# Patient Record
Sex: MALE | Race: WHITE | Employment: OTHER | URBAN - NONMETROPOLITAN AREA
[De-identification: names, ages, dates, MRNs, and addresses within clinical notes are randomized per-mention and may not be internally consistent; named-entity substitution may affect disease eponyms.]

---

## 2017-12-01 ENCOUNTER — OFFICE VISIT (OUTPATIENT)
Dept: FAMILY MEDICINE CLINIC | Age: 27
End: 2017-12-01
Payer: COMMERCIAL

## 2017-12-01 VITALS
TEMPERATURE: 98.2 F | BODY MASS INDEX: 33.43 KG/M2 | HEART RATE: 73 BPM | DIASTOLIC BLOOD PRESSURE: 92 MMHG | WEIGHT: 213 LBS | HEIGHT: 67 IN | OXYGEN SATURATION: 95 % | RESPIRATION RATE: 18 BRPM | SYSTOLIC BLOOD PRESSURE: 132 MMHG

## 2017-12-01 DIAGNOSIS — Z00.00 ANNUAL PHYSICAL EXAM: Primary | ICD-10-CM

## 2017-12-01 DIAGNOSIS — H91.91 HEARING LOSS OF RIGHT EAR, UNSPECIFIED HEARING LOSS TYPE: ICD-10-CM

## 2017-12-01 PROCEDURE — 99395 PREV VISIT EST AGE 18-39: CPT | Performed by: FAMILY MEDICINE

## 2017-12-01 RX ORDER — SUMATRIPTAN 100 MG/1
100 TABLET, FILM COATED ORAL
Qty: 9 TABLET | Refills: 11 | Status: SHIPPED | OUTPATIENT
Start: 2017-12-01 | End: 2018-04-16 | Stop reason: SDUPTHER

## 2017-12-01 ASSESSMENT — PATIENT HEALTH QUESTIONNAIRE - PHQ9
1. LITTLE INTEREST OR PLEASURE IN DOING THINGS: 0
SUM OF ALL RESPONSES TO PHQ QUESTIONS 1-9: 0
2. FEELING DOWN, DEPRESSED OR HOPELESS: 0
SUM OF ALL RESPONSES TO PHQ9 QUESTIONS 1 & 2: 0

## 2017-12-01 NOTE — PROGRESS NOTES
11     No current facility-administered medications for this visit. No Known Allergies    Health Maintenance   Topic Date Due    HIV screen  10/15/2007    DTaP/Tdap/Td vaccine (1 - Tdap) 10/15/2011    Pneumococcal med risk (1 of 1 - PPSV23) 10/15/2011    Flu vaccine (1) 09/01/2017       Subjective:      Review of Systems  Review of Systems   Constitutional: Negative for chills and fever. HENT: Negative for congestion. Respiratory: Negative for cough, chest tightness and shortness of breath. Cardiovascular: Negative for chest pain, palpitations and leg swelling. Gastrointestinal: Negative for abdominal pain, anal bleeding, constipation, diarrhea and nausea. Genitourinary: Negative for difficulty urinating. Psychiatric/Behavioral: Negative. See HPI for visit specific review of symptoms. All others negative      Objective:   BP (!) 132/92   Pulse 73   Temp 98.2 °F (36.8 °C)   Resp 18   Ht 5' 7\" (1.702 m)   Wt 213 lb (96.6 kg)   SpO2 95%   BMI 33.36 kg/m²   Physical Exam  Physical Exam   Constitutional: appears well-developed. does not appear ill. Eyes: Pupils are equal, round, and reactive to light. Neck: Normal range of motion. Neck supple. Cardiovascular: Normal rate and regular rhythm. Exam reveals no friction rub. No murmur heard. Pulmonary/Chest: Effort normal and breath sounds normal. No respiratory distress. He has no wheezes. no rales. Abdominal: Soft. Bowel sounds are normal. He exhibits no distension. There is no tenderness. There is no rebound and no guarding. Musculoskeletal: exhibits no edema. Neurological: alert. Psychiatric: normal mood and affect. behavior is normal.       No results found for this or any previous visit (from the past 672 hour(s)). Assessment & Plan: The following diagnoses and conditions are stable with no further orders unless indicated:  1. Annual physical exam      2.  Hearing loss of right ear, unspecified

## 2017-12-01 NOTE — PATIENT INSTRUCTIONS
The following may help for colds and allergies:    Antihistamines: Help nasal drainage, watery eyes, sneezing, sore throat.  Benadryl (diphenhydramine) 25mg every 6 hours as needed    Zyrtec (cetirizine) 10mg once a day   Allegra (fexofenadine) 180mg once a day   Claritin or Alavert (loratadine) 10mg once a day     Decongestants: Help ear pressure, sinus pressure, and nasal congestion.     Sudafed (pseudoephedrine) 6, 12, and 24 hour formulations   Phenylephrine     Expectorants: Loosen mucous and help nasal and chest congestion   Mucinex 600mg twice a day     Nasal Steroid: Help nasal drainage, nasal congestion, sneezing, and sinus pressure   Nasacort 1 spray each nostril once a day   Flonase 1 spray each nostril once a day   Rhinocort 1 spray each nostril once a day

## 2017-12-01 NOTE — LETTER
[address] [facility]  [address] [facility]  Phone: [phone]  Fax: [phone]    Teresa Jurado MD        December 1, 2017    [facility]  [address] [place] [address]      Dear Mauri Mckay: You are scheduled to see Dr. Claude Zhang on December 26th at 9:00am  If you need to reschedule, please call [phone]    If you have any questions or concerns, please don't hesitate to call.     Sincerely,        Teresa Jurado MD

## 2018-04-16 ENCOUNTER — OFFICE VISIT (OUTPATIENT)
Dept: FAMILY MEDICINE CLINIC | Age: 28
End: 2018-04-16
Payer: COMMERCIAL

## 2018-04-16 VITALS
WEIGHT: 214 LBS | OXYGEN SATURATION: 98 % | SYSTOLIC BLOOD PRESSURE: 124 MMHG | HEART RATE: 80 BPM | TEMPERATURE: 98.7 F | DIASTOLIC BLOOD PRESSURE: 86 MMHG | BODY MASS INDEX: 33.52 KG/M2 | RESPIRATION RATE: 18 BRPM

## 2018-04-16 DIAGNOSIS — R53.83 OTHER FATIGUE: ICD-10-CM

## 2018-04-16 DIAGNOSIS — R53.83 OTHER FATIGUE: Primary | ICD-10-CM

## 2018-04-16 LAB
ALBUMIN SERPL-MCNC: 4.6 G/DL (ref 3.5–5.2)
ALP BLD-CCNC: 88 U/L (ref 40–130)
ALT SERPL-CCNC: 50 U/L (ref 5–41)
ANION GAP SERPL CALCULATED.3IONS-SCNC: 17 MMOL/L (ref 7–19)
AST SERPL-CCNC: 23 U/L (ref 5–40)
BACTERIA: NEGATIVE /HPF
BASOPHILS ABSOLUTE: 0.1 K/UL (ref 0–0.2)
BASOPHILS RELATIVE PERCENT: 0.9 % (ref 0–1)
BILIRUB SERPL-MCNC: <0.2 MG/DL (ref 0.2–1.2)
BILIRUBIN URINE: NEGATIVE
BLOOD, URINE: NEGATIVE
BUN BLDV-MCNC: 16 MG/DL (ref 6–20)
CALCIUM SERPL-MCNC: 9.7 MG/DL (ref 8.6–10)
CHLORIDE BLD-SCNC: 102 MMOL/L (ref 98–111)
CLARITY: CLEAR
CO2: 23 MMOL/L (ref 22–29)
COLOR: YELLOW
CREAT SERPL-MCNC: 0.9 MG/DL (ref 0.5–1.2)
EOSINOPHILS ABSOLUTE: 0.1 K/UL (ref 0–0.6)
EOSINOPHILS RELATIVE PERCENT: 2 % (ref 0–5)
EPITHELIAL CELLS, UA: 0 /HPF (ref 0–5)
GFR NON-AFRICAN AMERICAN: >60
GLUCOSE BLD-MCNC: 88 MG/DL (ref 74–109)
GLUCOSE URINE: NEGATIVE MG/DL
HCT VFR BLD CALC: 49.8 % (ref 42–52)
HEMOGLOBIN: 17.4 G/DL (ref 14–18)
HYALINE CASTS: 0 /HPF (ref 0–8)
KETONES, URINE: NEGATIVE MG/DL
LEUKOCYTE ESTERASE, URINE: NEGATIVE
LYMPHOCYTES ABSOLUTE: 1.9 K/UL (ref 1.1–4.5)
LYMPHOCYTES RELATIVE PERCENT: 28.5 % (ref 20–40)
MCH RBC QN AUTO: 29.8 PG (ref 27–31)
MCHC RBC AUTO-ENTMCNC: 34.9 G/DL (ref 33–37)
MCV RBC AUTO: 85.4 FL (ref 80–94)
MONOCYTES ABSOLUTE: 0.6 K/UL (ref 0–0.9)
MONOCYTES RELATIVE PERCENT: 8.5 % (ref 0–10)
NEUTROPHILS ABSOLUTE: 3.9 K/UL (ref 1.5–7.5)
NEUTROPHILS RELATIVE PERCENT: 59.8 % (ref 50–65)
NITRITE, URINE: NEGATIVE
PDW BLD-RTO: 12.5 % (ref 11.5–14.5)
PH UA: 7
PLATELET # BLD: 301 K/UL (ref 130–400)
PMV BLD AUTO: 9.6 FL (ref 9.4–12.4)
POTASSIUM SERPL-SCNC: 4.3 MMOL/L (ref 3.5–5)
PROTEIN UA: NEGATIVE MG/DL
RBC # BLD: 5.83 M/UL (ref 4.7–6.1)
RBC UA: 0 /HPF (ref 0–4)
SODIUM BLD-SCNC: 142 MMOL/L (ref 136–145)
SPECIFIC GRAVITY UA: 1.02
T4 FREE: 1.3 NG/DL (ref 0.9–1.7)
TESTOSTERONE TOTAL: 420.1 NG/DL (ref 249–836)
TOTAL PROTEIN: 7 G/DL (ref 6.6–8.7)
TSH SERPL DL<=0.05 MIU/L-ACNC: 2.22 UIU/ML (ref 0.27–4.2)
UROBILINOGEN, URINE: 0.2 E.U./DL
WBC # BLD: 6.5 K/UL (ref 4.8–10.8)
WBC UA: 0 /HPF (ref 0–5)

## 2018-04-16 PROCEDURE — 99213 OFFICE O/P EST LOW 20 MIN: CPT | Performed by: FAMILY MEDICINE

## 2018-04-16 RX ORDER — SUMATRIPTAN 100 MG/1
100 TABLET, FILM COATED ORAL
Qty: 9 TABLET | Refills: 11 | Status: SHIPPED | OUTPATIENT
Start: 2018-04-16 | End: 2018-11-29 | Stop reason: SDUPTHER

## 2018-04-16 ASSESSMENT — ENCOUNTER SYMPTOMS
ANAL BLEEDING: 0
CONSTIPATION: 0
COUGH: 0
ABDOMINAL PAIN: 0
NAUSEA: 0
SHORTNESS OF BREATH: 0
DIARRHEA: 0
CHEST TIGHTNESS: 0

## 2018-05-12 ENCOUNTER — HOSPITAL ENCOUNTER (EMERGENCY)
Facility: HOSPITAL | Age: 28
Discharge: HOME | End: 2018-05-13
Attending: OTOLARYNGOLOGY | Admitting: OTOLARYNGOLOGY

## 2018-05-12 ENCOUNTER — HOSPITAL ENCOUNTER (EMERGENCY)
Age: 28
Discharge: LEFT BEFORE DISCHARGE | End: 2018-05-12
Payer: COMMERCIAL

## 2018-05-12 VITALS
DIASTOLIC BLOOD PRESSURE: 92 MMHG | WEIGHT: 200 LBS | TEMPERATURE: 98.8 F | HEART RATE: 90 BPM | OXYGEN SATURATION: 95 % | RESPIRATION RATE: 16 BRPM | HEIGHT: 68 IN | SYSTOLIC BLOOD PRESSURE: 145 MMHG | BODY MASS INDEX: 30.31 KG/M2

## 2018-05-12 PROBLEM — S01.81XA FACIAL LACERATION: Status: ACTIVE | Noted: 2018-05-12
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
## 2018-05-12 PROCEDURE — 99283 EMERGENCY DEPT VISIT LOW MDM: CPT | Performed by: OTOLARYNGOLOGY

## 2018-05-12 PROCEDURE — 13132 CMPLX RPR F/C/C/M/N/AX/G/H/F: CPT | Performed by: OTOLARYNGOLOGY

## 2018-05-12 PROCEDURE — 99283 EMERGENCY DEPT VISIT LOW MDM: CPT

## 2018-05-12 PROCEDURE — 4500000002 HC ER NO CHARGE

## 2018-05-12 RX ORDER — LIDOCAINE HYDROCHLORIDE AND EPINEPHRINE 10; 10 MG/ML; UG/ML
5 INJECTION, SOLUTION INFILTRATION; PERINEURAL ONCE
Status: COMPLETED | OUTPATIENT
Start: 2018-05-12 | End: 2018-05-13

## 2018-05-12 ASSESSMENT — PAIN SCALES - GENERAL: PAINLEVEL_OUTOF10: 1

## 2018-05-12 ASSESSMENT — PAIN DESCRIPTION - PAIN TYPE: TYPE: ACUTE PAIN

## 2018-05-12 ASSESSMENT — PAIN DESCRIPTION - LOCATION: LOCATION: FACE

## 2018-05-13 VITALS
OXYGEN SATURATION: 95 % | HEART RATE: 85 BPM | HEIGHT: 68 IN | WEIGHT: 200 LBS | BODY MASS INDEX: 30.31 KG/M2 | RESPIRATION RATE: 18 BRPM | DIASTOLIC BLOOD PRESSURE: 91 MMHG | SYSTOLIC BLOOD PRESSURE: 149 MMHG | TEMPERATURE: 98 F

## 2018-05-13 PROBLEM — S16.2XXA: Status: ACTIVE | Noted: 2018-05-13

## 2018-05-13 RX ORDER — CEPHALEXIN 500 MG/1
500 CAPSULE ORAL 4 TIMES DAILY
Qty: 28 CAPSULE | Refills: 0 | Status: SHIPPED | OUTPATIENT
Start: 2018-05-13 | End: 2021-03-12

## 2018-05-13 RX ORDER — HYDROCODONE BITARTRATE AND ACETAMINOPHEN 5; 325 MG/1; MG/1
1 TABLET ORAL ONCE AS NEEDED
Status: CANCELLED | OUTPATIENT
Start: 2018-05-13 | End: 2018-05-23

## 2018-05-13 RX ORDER — HYDROCODONE BITARTRATE AND ACETAMINOPHEN 5; 325 MG/1; MG/1
1 TABLET ORAL EVERY 4 HOURS PRN
Qty: 20 TABLET | Refills: 0 | Status: SHIPPED | OUTPATIENT
Start: 2018-05-13 | End: 2018-05-21

## 2018-05-13 RX ADMIN — LIDOCAINE HYDROCHLORIDE,EPINEPHRINE BITARTRATE 5 ML: 10; .01 INJECTION, SOLUTION INFILTRATION; PERINEURAL at 00:07

## 2018-05-21 ENCOUNTER — OFFICE VISIT (OUTPATIENT)
Dept: OTOLARYNGOLOGY | Facility: CLINIC | Age: 28
End: 2018-05-21

## 2018-05-21 VITALS
SYSTOLIC BLOOD PRESSURE: 139 MMHG | TEMPERATURE: 97 F | WEIGHT: 204 LBS | DIASTOLIC BLOOD PRESSURE: 99 MMHG | HEART RATE: 80 BPM | HEIGHT: 68 IN | BODY MASS INDEX: 30.92 KG/M2 | RESPIRATION RATE: 20 BRPM

## 2018-05-21 DIAGNOSIS — S01.91XD LACERATION OF HEAD AND NECK, SUBSEQUENT ENCOUNTER: Primary | ICD-10-CM

## 2018-05-21 DIAGNOSIS — S11.91XD LACERATION OF HEAD AND NECK, SUBSEQUENT ENCOUNTER: Primary | ICD-10-CM

## 2018-05-21 PROCEDURE — 99024 POSTOP FOLLOW-UP VISIT: CPT | Performed by: OTOLARYNGOLOGY

## 2018-05-21 RX ORDER — SUMATRIPTAN 100 MG/1
100 TABLET, FILM COATED ORAL
COMMUNITY
Start: 2018-04-16

## 2018-06-11 ENCOUNTER — TELEPHONE (OUTPATIENT)
Dept: FAMILY MEDICINE CLINIC | Age: 28
End: 2018-06-11

## 2018-09-19 ENCOUNTER — TRANSCRIBE ORDERS (OUTPATIENT)
Dept: ADMINISTRATIVE | Facility: HOSPITAL | Age: 28
End: 2018-09-19

## 2018-09-19 ENCOUNTER — APPOINTMENT (OUTPATIENT)
Dept: LAB | Facility: HOSPITAL | Age: 28
End: 2018-09-19
Attending: OBSTETRICS & GYNECOLOGY

## 2018-09-19 DIAGNOSIS — N46.9 INFERTILITY MALE: Primary | ICD-10-CM

## 2018-09-19 LAB
CHARACTER SMN: NORMAL
COLLECTION METHOD SMN: ABNORMAL
EPI CELLS #/AREA SMN HPF: ABNORMAL /HPF
LIQUEFACTION TIME SMN: NORMAL MIN
NON PROGRESSIVE MOTILITY: ABNORMAL %
RBC #/AREA SMN HPF: ABNORMAL /HPF
SEX ABSTIN DURATION TIME PATIENT: 3 DAYS
SLOW PROGRESSIVE MOTILITY: ABNORMAL %
SMI: 0 (ref 80–400)
SPEC CONTAINER SPEC: ABNORMAL
SPECIMEN VOL SMN: 3.5 ML
SPERM # SMN: <2 MILLIONS/ML
SPERM IMMOTILE NFR SMN: ABNORMAL %
SPERM MOTILE NFR SMN: 0 % MOTILE (ref 50–100)
SPERM SMN: ABNORMAL % NORMAL (ref 15–100)
VISC SMN: NORMAL CP
WBC SMN QL: ABNORMAL /HPF
WHO STANDARD: ABNORMAL

## 2018-09-19 PROCEDURE — 89320 SEMEN ANAL VOL/COUNT/MOT: CPT | Performed by: OBSTETRICS & GYNECOLOGY

## 2018-10-04 ENCOUNTER — TELEPHONE (OUTPATIENT)
Dept: OTOLARYNGOLOGY | Facility: CLINIC | Age: 28
End: 2018-10-04

## 2018-10-04 RX ORDER — SULFAMETHOXAZOLE AND TRIMETHOPRIM 800; 160 MG/1; MG/1
1 TABLET ORAL 2 TIMES DAILY
Qty: 20 TABLET | Refills: 0 | Status: SHIPPED | OUTPATIENT
Start: 2018-10-04 | End: 2018-10-14

## 2018-11-29 ENCOUNTER — OFFICE VISIT (OUTPATIENT)
Dept: FAMILY MEDICINE CLINIC | Age: 28
End: 2018-11-29
Payer: COMMERCIAL

## 2018-11-29 VITALS
WEIGHT: 211 LBS | BODY MASS INDEX: 32.08 KG/M2 | DIASTOLIC BLOOD PRESSURE: 86 MMHG | RESPIRATION RATE: 18 BRPM | OXYGEN SATURATION: 97 % | SYSTOLIC BLOOD PRESSURE: 134 MMHG | TEMPERATURE: 98 F | HEART RATE: 86 BPM

## 2018-11-29 DIAGNOSIS — L73.9 FOLLICULITIS: Primary | ICD-10-CM

## 2018-11-29 PROCEDURE — 99213 OFFICE O/P EST LOW 20 MIN: CPT | Performed by: FAMILY MEDICINE

## 2018-11-29 RX ORDER — DOXYCYCLINE 100 MG/1
100 CAPSULE ORAL 2 TIMES DAILY
Qty: 20 CAPSULE | Refills: 0 | Status: SHIPPED | OUTPATIENT
Start: 2018-11-29

## 2018-11-29 RX ORDER — SUMATRIPTAN 100 MG/1
100 TABLET, FILM COATED ORAL
Qty: 9 TABLET | Refills: 11 | Status: SHIPPED | OUTPATIENT
Start: 2018-11-29 | End: 2018-11-29

## 2018-11-29 NOTE — LETTER
[address] [facility]  [address]  Phone: [phone]  Fax: [phone]    ANIBAL Burrell MD            Patient: Shawnee Anderson   YOB: 1990   Date of Visit: 11/29/2018        To Whom It May Concern:    Please excuse Sharla Dumont from work on 11/28/2018 due to illness. If you have any questions or concerns, please don't hesitate to call.     Sincerely,        Nieves Perez MD

## 2018-11-29 NOTE — PROGRESS NOTES
SUMAtriptan (IMITREX) 100 MG tablet; Take 1 tablet by mouth once as needed for Migraine  Dispense: 9 tablet; Refill: 11    2. Folliculitis    - doxycycline monohydrate (MONODOX) 100 MG capsule; Take 1 capsule by mouth 2 times daily  Dispense: 20 capsule; Refill: 0            Return in about 6 months (around 5/29/2019) for Routine follow up - 15 minute visit. Discussed use, benefit, and side effects of prescribed medications. All patientquestions answered. Pt voiced understanding. Reviewed health maintenance. Instructedto continue current medications, diet and exercise. Patient agreed with treatmentplan. Follow up as directed.

## 2019-01-30 ENCOUNTER — TELEPHONE (OUTPATIENT)
Dept: FAMILY MEDICINE CLINIC | Age: 29
End: 2019-01-30

## 2019-05-31 ENCOUNTER — TELEPHONE (OUTPATIENT)
Dept: FAMILY MEDICINE CLINIC | Age: 29
End: 2019-05-31

## 2019-05-31 NOTE — TELEPHONE ENCOUNTER
Carolynne Media called to cancel his appt with Dr. Tiny Dancer for 6/3/19 and advised that he has moved to [place]. Thank you.

## 2020-08-14 ENCOUNTER — OFFICE VISIT (OUTPATIENT)
Dept: INTERNAL MEDICINE | Facility: CLINIC | Age: 30
End: 2020-08-14

## 2020-08-14 VITALS
RESPIRATION RATE: 20 BRPM | SYSTOLIC BLOOD PRESSURE: 153 MMHG | HEART RATE: 73 BPM | DIASTOLIC BLOOD PRESSURE: 87 MMHG | BODY MASS INDEX: 34.4 KG/M2 | TEMPERATURE: 98.7 F | WEIGHT: 219.2 LBS | HEIGHT: 67 IN

## 2020-08-14 DIAGNOSIS — F41.9 ANXIETY: Primary | ICD-10-CM

## 2020-08-14 DIAGNOSIS — F32.89 OTHER DEPRESSION: ICD-10-CM

## 2020-08-14 DIAGNOSIS — R07.9 CHEST PAIN, UNSPECIFIED TYPE: ICD-10-CM

## 2020-08-14 DIAGNOSIS — R03.0 ELEVATED BLOOD PRESSURE READING: ICD-10-CM

## 2020-08-14 PROCEDURE — 93000 ELECTROCARDIOGRAM COMPLETE: CPT | Performed by: NURSE PRACTITIONER

## 2020-08-14 PROCEDURE — 99214 OFFICE O/P EST MOD 30 MIN: CPT | Performed by: NURSE PRACTITIONER

## 2020-08-14 RX ORDER — FLUOXETINE 10 MG/1
10 CAPSULE ORAL DAILY
Qty: 45 CAPSULE | Refills: 0 | Status: SHIPPED | OUTPATIENT
Start: 2020-08-14 | End: 2020-08-29 | Stop reason: SDUPTHER

## 2020-08-14 RX ORDER — FLUOXETINE 10 MG/1
10 CAPSULE ORAL DAILY
Qty: 45 CAPSULE | Refills: 0 | Status: SHIPPED | OUTPATIENT
Start: 2020-08-14 | End: 2020-08-14

## 2020-08-14 RX ORDER — BUSPIRONE HYDROCHLORIDE 5 MG/1
5 TABLET ORAL 3 TIMES DAILY
Qty: 90 TABLET | Refills: 0 | Status: SHIPPED | OUTPATIENT
Start: 2020-08-14 | End: 2020-08-29 | Stop reason: SDUPTHER

## 2020-08-14 RX ORDER — BUSPIRONE HYDROCHLORIDE 5 MG/1
5 TABLET ORAL 3 TIMES DAILY
Qty: 90 TABLET | Refills: 0 | Status: SHIPPED | OUTPATIENT
Start: 2020-08-14 | End: 2020-08-14

## 2020-08-28 DIAGNOSIS — F41.9 ANXIETY: ICD-10-CM

## 2020-08-28 DIAGNOSIS — F32.89 OTHER DEPRESSION: ICD-10-CM

## 2020-08-29 RX ORDER — FLUOXETINE 10 MG/1
10 CAPSULE ORAL DAILY
Qty: 90 CAPSULE | Refills: 0 | Status: SHIPPED | OUTPATIENT
Start: 2020-08-29 | End: 2020-09-16 | Stop reason: SDUPTHER

## 2020-08-29 RX ORDER — BUSPIRONE HYDROCHLORIDE 5 MG/1
5 TABLET ORAL 3 TIMES DAILY
Qty: 270 TABLET | Refills: 0 | Status: SHIPPED | OUTPATIENT
Start: 2020-08-29 | End: 2020-09-16 | Stop reason: SDUPTHER

## 2020-09-16 ENCOUNTER — OFFICE VISIT (OUTPATIENT)
Dept: INTERNAL MEDICINE | Facility: CLINIC | Age: 30
End: 2020-09-16

## 2020-09-16 VITALS
SYSTOLIC BLOOD PRESSURE: 132 MMHG | BODY MASS INDEX: 34.59 KG/M2 | RESPIRATION RATE: 18 BRPM | TEMPERATURE: 97.9 F | HEIGHT: 67 IN | DIASTOLIC BLOOD PRESSURE: 84 MMHG | WEIGHT: 220.38 LBS | OXYGEN SATURATION: 97 % | HEART RATE: 70 BPM

## 2020-09-16 DIAGNOSIS — F41.9 ANXIETY: Primary | ICD-10-CM

## 2020-09-16 DIAGNOSIS — F32.89 OTHER DEPRESSION: ICD-10-CM

## 2020-09-16 PROCEDURE — 99213 OFFICE O/P EST LOW 20 MIN: CPT | Performed by: NURSE PRACTITIONER

## 2020-09-16 RX ORDER — FLUOXETINE HYDROCHLORIDE 20 MG/1
20 CAPSULE ORAL DAILY
Qty: 90 CAPSULE | Refills: 1 | Status: SHIPPED | OUTPATIENT
Start: 2020-09-16 | End: 2021-04-07 | Stop reason: SDUPTHER

## 2020-09-16 RX ORDER — BUSPIRONE HYDROCHLORIDE 10 MG/1
10 TABLET ORAL 3 TIMES DAILY
Qty: 270 TABLET | Refills: 1 | Status: SHIPPED | OUTPATIENT
Start: 2020-09-16 | End: 2021-04-07 | Stop reason: SDUPTHER

## 2021-02-23 ENCOUNTER — LAB (OUTPATIENT)
Dept: LAB | Facility: HOSPITAL | Age: 31
End: 2021-02-23

## 2021-02-23 ENCOUNTER — TRANSCRIBE ORDERS (OUTPATIENT)
Dept: LAB | Facility: HOSPITAL | Age: 31
End: 2021-02-23

## 2021-02-23 DIAGNOSIS — Z31.41 FERTILITY TESTING: ICD-10-CM

## 2021-02-23 DIAGNOSIS — Z31.41 FERTILITY TESTING: Primary | ICD-10-CM

## 2021-02-23 LAB
CHARACTER SMN: NORMAL
COLLECTION METHOD SMN: ABNORMAL
EPI CELLS #/AREA SMN HPF: ABNORMAL /HPF
LIQUEFACTION TIME SMN: NORMAL MIN
NON PROGRESSIVE MOTILITY: ABNORMAL
RBC #/AREA SMN HPF: ABNORMAL /HPF
SEX ABSTIN DURATION TIME PATIENT: 4 DAYS
SLOW PROGRESSIVE MOTILITY: ABNORMAL
SMI: 0 (ref 80–400)
SPEC CONTAINER SPEC: ABNORMAL
SPECIMEN VOL SMN: 7 ML
SPERM # SMN: <2 MILLIONS/ML
SPERM IMMOTILE NFR SMN: ABNORMAL %
SPERM MOTILE NFR SMN: 0 % MOTILE (ref 50–100)
SPERM PROG NFR SMN: ABNORMAL %
SPERM SMN: ABNORMAL
VISC SMN: NORMAL CP
WBC SMN QL: ABNORMAL /HPF
WHO STANDARD: ABNORMAL

## 2021-02-23 PROCEDURE — 89320 SEMEN ANAL VOL/COUNT/MOT: CPT

## 2021-03-02 DIAGNOSIS — R86.9 ABNORMAL SEMEN ANALYSIS: Primary | ICD-10-CM

## 2021-03-12 ENCOUNTER — OFFICE VISIT (OUTPATIENT)
Dept: INTERNAL MEDICINE | Facility: CLINIC | Age: 31
End: 2021-03-12

## 2021-03-12 VITALS
HEART RATE: 77 BPM | TEMPERATURE: 97.8 F | RESPIRATION RATE: 16 BRPM | OXYGEN SATURATION: 98 % | SYSTOLIC BLOOD PRESSURE: 136 MMHG | DIASTOLIC BLOOD PRESSURE: 86 MMHG

## 2021-03-12 DIAGNOSIS — R50.9 FEVER, UNSPECIFIED FEVER CAUSE: Primary | ICD-10-CM

## 2021-03-12 DIAGNOSIS — J02.9 PHARYNGITIS, UNSPECIFIED ETIOLOGY: ICD-10-CM

## 2021-03-12 PROCEDURE — 87880 STREP A ASSAY W/OPTIC: CPT | Performed by: NURSE PRACTITIONER

## 2021-03-12 PROCEDURE — 87635 SARS-COV-2 COVID-19 AMP PRB: CPT | Performed by: NURSE PRACTITIONER

## 2021-03-12 PROCEDURE — 87804 INFLUENZA ASSAY W/OPTIC: CPT | Performed by: NURSE PRACTITIONER

## 2021-03-12 PROCEDURE — 99213 OFFICE O/P EST LOW 20 MIN: CPT | Performed by: NURSE PRACTITIONER

## 2021-03-12 RX ORDER — AMOXICILLIN 500 MG/1
1000 CAPSULE ORAL 2 TIMES DAILY
Qty: 40 CAPSULE | Refills: 0 | Status: SHIPPED | OUTPATIENT
Start: 2021-03-12

## 2021-03-12 RX ORDER — BENZONATATE 100 MG/1
100 CAPSULE ORAL 3 TIMES DAILY PRN
Qty: 21 CAPSULE | Refills: 0 | Status: SHIPPED | OUTPATIENT
Start: 2021-03-12

## 2021-03-13 ENCOUNTER — LAB REQUISITION (OUTPATIENT)
Dept: LAB | Facility: HOSPITAL | Age: 31
End: 2021-03-13

## 2021-03-13 DIAGNOSIS — Z00.00 ENCOUNTER FOR GENERAL ADULT MEDICAL EXAMINATION WITHOUT ABNORMAL FINDINGS: ICD-10-CM

## 2021-03-13 LAB — SARS-COV-2 RNA PNL SPEC NAA+PROBE: NOT DETECTED

## 2021-03-15 LAB
EXPIRATION DATE: NORMAL
EXPIRATION DATE: NORMAL
FLUAV AG NPH QL: NEGATIVE
FLUBV AG NPH QL: NEGATIVE
INTERNAL CONTROL: NORMAL
INTERNAL CONTROL: NORMAL
Lab: NORMAL
Lab: NORMAL
S PYO AG THROAT QL: NEGATIVE

## 2021-04-07 DIAGNOSIS — F32.89 OTHER DEPRESSION: ICD-10-CM

## 2021-04-07 DIAGNOSIS — F41.9 ANXIETY: ICD-10-CM

## 2021-04-07 RX ORDER — BUSPIRONE HYDROCHLORIDE 10 MG/1
10 TABLET ORAL 3 TIMES DAILY
Qty: 270 TABLET | Refills: 1 | Status: SHIPPED | OUTPATIENT
Start: 2021-04-07 | End: 2021-07-06

## 2021-04-07 RX ORDER — FLUOXETINE HYDROCHLORIDE 20 MG/1
20 CAPSULE ORAL DAILY
Qty: 90 CAPSULE | Refills: 1 | Status: SHIPPED | OUTPATIENT
Start: 2021-04-07 | End: 2021-07-06

## 2022-09-22 ENCOUNTER — APPOINTMENT (OUTPATIENT)
Dept: GENERAL RADIOLOGY | Facility: HOSPITAL | Age: 32
End: 2022-09-22

## 2022-09-22 ENCOUNTER — HOSPITAL ENCOUNTER (EMERGENCY)
Facility: HOSPITAL | Age: 32
Discharge: HOME | End: 2022-09-22
Admitting: FAMILY MEDICINE

## 2022-09-22 ENCOUNTER — APPOINTMENT (OUTPATIENT)
Dept: ULTRASOUND IMAGING | Facility: HOSPITAL | Age: 32
End: 2022-09-22

## 2022-09-22 VITALS
BODY MASS INDEX: 39.24 KG/M2 | RESPIRATION RATE: 20 BRPM | HEART RATE: 76 BPM | DIASTOLIC BLOOD PRESSURE: 79 MMHG | HEIGHT: 67 IN | TEMPERATURE: 98.6 F | OXYGEN SATURATION: 98 % | SYSTOLIC BLOOD PRESSURE: 145 MMHG | WEIGHT: 250 LBS

## 2022-09-22 DIAGNOSIS — M25.572 ACUTE LEFT ANKLE PAIN: Primary | ICD-10-CM

## 2022-09-22 LAB — URATE SERPL-MCNC: 5.6 MG/DL (ref 3.4–7)

## 2022-09-22 PROCEDURE — 73610 X-RAY EXAM OF ANKLE: CPT

## 2022-09-22 PROCEDURE — 93971 EXTREMITY STUDY: CPT | Performed by: SURGERY

## 2022-09-22 PROCEDURE — 93971 EXTREMITY STUDY: CPT

## 2022-09-22 PROCEDURE — 73630 X-RAY EXAM OF FOOT: CPT

## 2022-09-22 PROCEDURE — 99283 EMERGENCY DEPT VISIT LOW MDM: CPT

## 2022-09-22 PROCEDURE — 36415 COLL VENOUS BLD VENIPUNCTURE: CPT

## 2022-09-22 PROCEDURE — 84550 ASSAY OF BLOOD/URIC ACID: CPT | Performed by: NURSE PRACTITIONER

## 2024-02-12 ENCOUNTER — TELEMEDICINE (OUTPATIENT)
Dept: FAMILY MEDICINE CLINIC | Facility: TELEHEALTH | Age: 34
End: 2024-02-12
Payer: OTHER GOVERNMENT

## 2024-02-12 VITALS — HEART RATE: 93 BPM | TEMPERATURE: 98.4 F

## 2024-02-12 DIAGNOSIS — J06.9 ACUTE URI: Primary | ICD-10-CM

## 2024-02-12 DIAGNOSIS — J10.1 INFLUENZA A: ICD-10-CM
